# Patient Record
Sex: FEMALE | URBAN - METROPOLITAN AREA
[De-identification: names, ages, dates, MRNs, and addresses within clinical notes are randomized per-mention and may not be internally consistent; named-entity substitution may affect disease eponyms.]

---

## 2021-06-09 ENCOUNTER — OFFICE VISIT (OUTPATIENT)
Dept: URBAN - METROPOLITAN AREA CLINIC 56 | Facility: CLINIC | Age: 51
End: 2021-06-09
Payer: COMMERCIAL

## 2021-06-09 DIAGNOSIS — H57.10 OCULAR PAIN, UNSPECIFIED EYE: Primary | ICD-10-CM

## 2021-06-09 DIAGNOSIS — H52.4 PRESBYOPIA: ICD-10-CM

## 2021-06-09 DIAGNOSIS — H25.13 AGE-RELATED NUCLEAR CATARACT, BILATERAL: ICD-10-CM

## 2021-06-09 PROCEDURE — 92004 COMPRE OPH EXAM NEW PT 1/>: CPT | Performed by: OPTOMETRIST

## 2021-06-09 ASSESSMENT — VISUAL ACUITY
OS: 20/20
OD: 20/20

## 2021-06-09 ASSESSMENT — INTRAOCULAR PRESSURE
OS: 14
OD: 14

## 2021-06-09 ASSESSMENT — KERATOMETRY
OD: 43.70
OS: 43.84

## 2021-06-09 NOTE — IMPRESSION/PLAN
Impression: Ocular pain, unspecified eye: H57.10. Plan: No ocular or refractive finding explain the etiology for the patient's presenting intermittent eye pain. Likely ethology is referred main from possible sinusitis. Proceed with follow-up care with her PCP.

## 2023-10-10 ENCOUNTER — OFFICE VISIT (OUTPATIENT)
Dept: URBAN - METROPOLITAN AREA CLINIC 51 | Facility: CLINIC | Age: 53
End: 2023-10-10
Payer: COMMERCIAL

## 2023-10-10 DIAGNOSIS — H25.13 AGE-RELATED NUCLEAR CATARACT, BILATERAL: ICD-10-CM

## 2023-10-10 DIAGNOSIS — H43.811 VITREOUS DEGENERATION, RIGHT EYE: Primary | ICD-10-CM

## 2023-10-10 DIAGNOSIS — H52.4 PRESBYOPIA: ICD-10-CM

## 2023-10-10 DIAGNOSIS — H04.209 EPIPHORA: ICD-10-CM

## 2023-10-10 PROCEDURE — 92014 COMPRE OPH EXAM EST PT 1/>: CPT | Performed by: OPTOMETRIST

## 2023-10-10 PROCEDURE — 92134 CPTRZ OPH DX IMG PST SGM RTA: CPT | Performed by: OPTOMETRIST

## 2023-10-10 ASSESSMENT — KERATOMETRY
OD: 43.75
OS: 44.00

## 2023-10-10 ASSESSMENT — VISUAL ACUITY
OS: 20/20
OD: 20/20